# Patient Record
Sex: FEMALE | Race: WHITE | NOT HISPANIC OR LATINO | Employment: OTHER | URBAN - METROPOLITAN AREA
[De-identification: names, ages, dates, MRNs, and addresses within clinical notes are randomized per-mention and may not be internally consistent; named-entity substitution may affect disease eponyms.]

---

## 2018-02-23 ENCOUNTER — OFFICE VISIT (OUTPATIENT)
Dept: FAMILY MEDICINE CLINIC | Facility: CLINIC | Age: 38
End: 2018-02-23
Payer: COMMERCIAL

## 2018-02-23 VITALS
HEIGHT: 65 IN | TEMPERATURE: 98 F | DIASTOLIC BLOOD PRESSURE: 80 MMHG | SYSTOLIC BLOOD PRESSURE: 120 MMHG | WEIGHT: 185 LBS | BODY MASS INDEX: 30.82 KG/M2

## 2018-02-23 DIAGNOSIS — Z13.0 SCREENING FOR DEFICIENCY ANEMIA: ICD-10-CM

## 2018-02-23 DIAGNOSIS — Z30.09 BIRTH CONTROL COUNSELING: ICD-10-CM

## 2018-02-23 DIAGNOSIS — Z13.220 SCREENING, LIPID: ICD-10-CM

## 2018-02-23 DIAGNOSIS — Z13.1 SCREENING FOR DIABETES MELLITUS: ICD-10-CM

## 2018-02-23 DIAGNOSIS — Z13.29 SCREENING FOR THYROID DISORDER: ICD-10-CM

## 2018-02-23 DIAGNOSIS — Z23 NEED FOR DTAP VACCINATION: ICD-10-CM

## 2018-02-23 DIAGNOSIS — L71.9 ROSACEA: Primary | ICD-10-CM

## 2018-02-23 PROCEDURE — 90471 IMMUNIZATION ADMIN: CPT

## 2018-02-23 PROCEDURE — 90715 TDAP VACCINE 7 YRS/> IM: CPT

## 2018-02-23 PROCEDURE — 99203 OFFICE O/P NEW LOW 30 MIN: CPT | Performed by: FAMILY MEDICINE

## 2018-02-23 RX ORDER — DOXYCYCLINE HYCLATE 100 MG/1
100 CAPSULE ORAL DAILY
Qty: 30 CAPSULE | Refills: 3 | Status: SHIPPED | OUTPATIENT
Start: 2018-02-23 | End: 2018-03-25

## 2018-02-23 RX ORDER — METRONIDAZOLE 10 MG/G
GEL TOPICAL DAILY
Qty: 45 G | Refills: 1 | Status: SHIPPED | OUTPATIENT
Start: 2018-02-23

## 2018-02-23 NOTE — PATIENT INSTRUCTIONS
Rosacea   WHAT YOU NEED TO KNOW:   What is rosacea? Rosacea is a long-term skin condition that causes inflammation of your face  Rosacea usually affects the cheeks and chin  It may also affect the nose, forehead, and eyes  There is no known cause of rosacea  Healthcare providers believe the facial redness occurs when blood vessels in your face widen  Rosacea is more common in women, people with fair skin, and those aged 27 or older  What are the signs and symptoms of rosacea? Your face will be red with red bumps  The bumps often look like acne pimples  Symptoms range from mild to severe  You may have periods of time with no symptoms  When you have symptoms, this is called a flare  Some signs and symptoms will depend on the type of rosacea you have  The 4 types of rosacea are:  Erythematotelangiectatic:  This is also called ETR  Your face may be red and swollen for long periods  Your skin may burn, itch, or sting more easily when you use creams or lotions on your face  Your skin may also be drier than with other types of rosacea  Papulopustular: Your skin may swell, burn, or sting  The bumps on your skin may be filled with pus  Phymatous: This type causes your skin on your nose, chin, forehead, cheeks, eyelids, or ears to thicken  Your nose may also look bumpy  Ocular: This type of rosacea affects your eyes  You may have dry or watery, red eyes  Your eyes may burn, sting, or itch  You may have eyelid swelling or feel like you have something in your eye  You may also have blurry vision  Your eyes may hurt when you are in bright light  What increases my risk for rosacea flares? Heavy exercise    Hot drinks or drinks that contain alcohol    Spicy foods    Stress, sudden laughter, or embarrassment    Sun exposure    Very hot, cold, or windy weather  How is rosacea diagnosed? Your healthcare provider will ask about your signs and symptoms  He may know you have rosacea by looking at your skin   Your healthcare provider may also do blood tests to learn if another medical condition is the cause of your symptoms  How is rosacea treated? Rosacea has no cure  With treatment, your symptoms can be controlled  Ask your healthcare provider for more information about the following treatments:  Antibiotic medicines: This medicine is given to treat or prevent infection  Antibiotics may also help decrease swelling, redness, and acne-like bumps  This medicine may be given as a pill or a cream to apply on your face  Artificial tears: These help keep your eyes moist if you have dryness from ocular rosacea  Laser ablation:  A laser removes tissue buildup and reduces redness and swelling  Surgery: You may need surgery to remove thickened skin on your face  How can I prevent a rosacea flare? Avoid hot drinks or drinks that contain alcohol  Avoid being in the sun for long periods of time  Use sunscreen that is SPF 15 or higher every time you go outside  Wear a wide-brimmed hat while you are outdoors  Avoid skin care products that have alcohol, menthol, or salt in them  Use fragrance-free products to wash your face  Be gentle when you wash your face to avoid irritation  Ask your healthcare provider which products are best to treat dry skin  Clean your eyelids as directed  Your healthcare provider may suggest you put warm compresses on your eyes 2 times each day  When should I contact my healthcare provider? You have new or worse eye redness or itching  You feel depressed about the look of your skin  You have questions about your condition or care  When should I seek immediate care or call 911? You have new or increased blurry vision, or you have vision loss  CARE AGREEMENT:   You have the right to help plan your care  Learn about your health condition and how it may be treated  Discuss treatment options with your caregivers to decide what care you want to receive   You always have the right to refuse treatment  The above information is an  only  It is not intended as medical advice for individual conditions or treatments  Talk to your doctor, nurse or pharmacist before following any medical regimen to see if it is safe and effective for you  © 2017 [address] [employer] Information is for End User's use only and may not be sold, redistributed or otherwise used for commercial purposes  All illustrations and images included in CareNotes® are the copyrighted property of [employer]  or [employer]  Kegel Exercises for Women   WHAT YOU NEED TO KNOW:   What are Kegel exercises? Kegel exercises help strengthen your pelvic muscles  Pelvic muscles hold your pelvic organs, such as your bladder and uterus, in place  Kegel exercises help prevent or control problems with urine incontinence (leakage)  Incontinence may be caused by pregnancy, childbirth, or menopause  How will I know I am using the correct muscles? Pelvic muscles are the muscles you use to control urine flow  To target these muscles, stop and start the flow of urine several times  This will help you become familiar with how it feels to tighten and relax these muscles  How should I do Kegel exercises? Empty your bladder  You may lie down, stand up, or sit down to do these exercises  When you first try to do these exercises, it may be easier if you lie down  Tighten or squeeze your pelvic muscles slowly  It may feel like you are trying to hold back urine or gas  Hold this position for 3 seconds  Relax for 3 seconds  Repeat this cycle 10 times  Do 10 sets of Kegel exercises, at least 3 times a day  Do not hold your breath when you do Kegel exercises  Keep your stomach, back, and leg muscles relaxed  As your muscles get stronger, you will be able to hold the squeeze longer  Your healthcare provider may ask that you increase your pelvic muscle squeeze to 10 seconds   After you squeeze for 10 seconds, relax for 10 seconds  What else should I know? Once you know how to do Kegel exercises, use different positions  This will help to strengthen your pelvic muscles as much as possible  You can do these exercises while you lie on the floor, watch TV, or while you stand  You may notice improved bladder control within about 6 weeks  Tighten your pelvic muscles before you sneeze, cough, or lift to prevent urine leakage  When should I contact my healthcare provider? You cannot feel your pelvic muscles tighten or relax  You continue to leak urine  You have questions or concerns about your condition or care  CARE AGREEMENT:   You have the right to help plan your care  Learn about your health condition and how it may be treated  Discuss treatment options with your caregivers to decide what care you want to receive  You always have the right to refuse treatment  The above information is an  only  It is not intended as medical advice for individual conditions or treatments  Talk to your doctor, nurse or pharmacist before following any medical regimen to see if it is safe and effective for you  © 2017 [address] [facility] Information is for End User's use only and may not be sold, redistributed or otherwise used for commercial purposes  All illustrations and images included in CareNotes® are the copyrighted property of [employer]  or [employer]  Birth Control Pills   WHAT YOU NEED TO KNOW:   Birth control pills are also called oral contraceptives, or the pill  It is medicine that helps prevent pregnancy  Birth control pills work by preventing ovulation  Ovulation is when the ovaries make and release an egg cell each month  If this egg gets fertilized by sperm, pregnancy occurs  Birth control pills may also help to prevent pregnancy by keeping sperm from fertilizing an egg     DISCHARGE INSTRUCTIONS:   Follow up with your healthcare provider as directed:  Write down your questions so you remember to ask them during your visits  Advantages of birth control pills:  When birth control pills are used correctly, the chances of getting pregnant are very low  Birth control pills may help decrease bleeding and pain during your monthly period  They may also help prevent cancer of the uterus and ovaries  Disadvantages of birth control pills: You may have sudden changes in your mood or feelings while you take birth control pills  You may have nausea and decreased sex drive  You may have an increased appetite and rapid weight gain  You may also have bleeding in between periods, less frequent periods, vaginal dryness, and breast pain  Birth control pills will not protect you from sexually transmitted infections  Rarely, some birth control pills can increase your risk for a blood clot  This may become life-threatening  If you want to get pregnant: If you are planning to have a baby, ask your healthcare provider when you may stop taking your birth control pills  It may take some time for you to start ovulating again  Ask your healthcare provider for more information about pregnancy after birth control pills  When to start taking birth control pills after you have a baby: If you are not breastfeeding, you may start taking birth control pills 3 weeks after you give birth  You may be able to take certain types of birth control pills if you are breastfeeding  These pills can be started from 6 weeks to 6 months after you give birth  Ask your healthcare provider for more information about when to start taking birth control pills after you give birth  Contact your healthcare provider if:   You have forgotten to take a birth control pill  You have mood changes, such as depression, since starting birth control pills  You have nausea or you are vomiting  You have severe abdominal pain  You missed a period and have questions or concerns about being pregnant      You still have bleeding 4 months after taking birth control pills correctly  You have questions or concerns about your condition or care  Return to the emergency department if:   Your arm or leg feels warm, tender, and painful  It may look swollen and red  You feel lightheaded, short of breath, and have chest pain  You cough up blood  You have any of the following signs of a stroke:      Numbness or drooping on one side of your face     Weakness in an arm or leg    Confusion or difficulty speaking    Dizziness, a severe headache, or vision loss    You have severe pain, numbness, or swelling in your arms or legs  © 2017 [address] [employer]  Information is for End User's use only and may not be sold, redistributed or otherwise used for commercial purposes  All illustrations and images included in CareNotes® are the copyrighted property of [employer]  or [employer]  The above information is an  only  It is not intended as medical advice for individual conditions or treatments  Talk to your doctor, nurse or pharmacist before following any medical regimen to see if it is safe and effective for you  Diphtheria/Acellular Pertussis/Tetanus Booster Vaccine (Tdap) (By injection)   Pertussis Vaccine, Acellular (per-TUS-iss VAX-een, g-LAYG-alg-lar), Reduced Diphtheria Toxoid (ree-DOOST dif-THEER-ee-a TOX-oyd), Tetanus Toxoid (TET-a-nus TOX-oyd)  Protects against infections caused by tetanus (lockjaw), diphtheria, or pertussis (whooping cough)  This is a booster vaccine  Brand Name(s): Adacel, Boostrix   There may be other brand names for this medicine  When This Medicine Should Not Be Used: You should not receive this vaccine if you have had an allergic reaction to the separate or combined tetanus, diphtheria, or pertussis vaccine   You should not receive this vaccine if you have had seizures, mental changes, or any other serious reaction within 7 days after you received a pertussis vaccine  How to Use This Medicine:   Injectable  · A nurse or other health provider will give you this medicine  · Your doctor will prescribe your exact dose and tell you how often it should be given  This medicine is given as a shot into one of your muscles  · You may receive other vaccines at the same time as this one, but in a different body area  You should receive patient instructions for all of the vaccines  Talk to your doctor or nurse if you have questions  · Read and follow the patient instructions that come with this medicine  Talk to your doctor or pharmacist if you have any questions  Drugs and Foods to Avoid:   Ask your doctor or pharmacist before using any other medicine, including over-the-counter medicines, vitamins, and herbal products  · Make sure the doctor knows if you are receiving a treatment or medicine that weakens your immune system  This includes radiation treatment, steroid medicines (such as dexamethasone, hydrocortisone, methylprednisolone, prednisolone, prednisone, Medrol®), or cancer medicines  Warnings While Using This Medicine:   · Make sure your doctor knows if you are pregnant or breastfeeding or have epilepsy, a weak immune system, or a history of a stroke  Tell your doctor if you are sick or have a fever  · Tell your doctor about any reaction you had after you received a vaccine  This includes fainting, seizures, a fever over 105 degrees F, or severe redness or swelling where the shot was given  Tell your doctor if you have a history of Guillain-Barré syndrome after you received a vaccine with tetanus  · Call your doctor right away if you faint or have vision changes, numbness or tingling in your arms, hands, or feet, or a seizure after you receive this vaccine  · Tell your doctor if you have an allergy to latex  The syringes may contain dry natural latex rubber  · This vaccine will not treat an active infection   If you have a diphtheria, tetanus, or pertussis infection, you will need medicine to treat the infection  Possible Side Effects While Using This Medicine:   Call your doctor right away if you notice any of these side effects:  · Allergic reaction: Itching or hives, swelling in your face or hands, swelling or tingling in your mouth or throat, chest tightness, trouble breathing  · Changes in vision  · Fever over 105 degrees F  · Lightheadedness or fainting  · Numbness, tingling, or burning pain in your hands, arms, legs, or feet  · Seizures  · Sudden numbness or weakness in your arms or legs  · Severe pain, redness, or swelling where the shot was given  If you notice these less serious side effects, talk with your doctor:   · Headache  · Mild pain, redness, or swelling where the shot was given  · Nausea, vomiting, diarrhea, or stomach pain  · Tiredness  If you notice other side effects that you think are caused by this medicine, tell your doctor  Call your doctor for medical advice about side effects  You may report side effects to FDA at 3-027-FDA-4039  © 2017 [address] [employer] Information is for End User's use only and may not be sold, redistributed or otherwise used for commercial purposes  The above information is an  only  It is not intended as medical advice for individual conditions or treatments  Talk to your doctor, nurse or pharmacist before following any medical regimen to see if it is safe and effective for you

## 2018-02-23 NOTE — PROGRESS NOTES
Assessment/Plan:    Rosacea  Continue metrogel as directed and add the antibiotic Patient was also given educational information       Diagnoses and all orders for this visit:    Rosacea  -     metroNIDAZOLE (METROGEL) 1 % gel; Apply topically daily  -     doxycycline hyclate (VIBRAMYCIN) 100 mg capsule; Take 1 capsule (100 mg total) by mouth daily for 30 days    Birth control counseling  Comments:  Patient was given information about various methods of birthcontrol Patient was recommended to ahve PAP smear also    Screening for diabetes mellitus  Comments:  check labs  Orders:  -     Comprehensive metabolic panel; Future    Screening, lipid  Comments:  check labs  Orders:  -     Lipid panel; Future    Screening for thyroid disorder  Comments:  check labs  Orders:  -     TSH, 3rd generation with T4 reflex; Future    Screening for deficiency anemia  Comments:  check labs  Orders:  -     CBC and differential; Future    Need for DTaP vaccination  Comments:  information on tetanus shot given to patient and shot given also  Orders:  -     Tdap vaccine greater than or equal to 6yo IM          Subjective:   Chief Complaint   Patient presents with    Rosacea          Patient ID: Umer Crews is a 40 y o  female      Patient is here as a new patient she is here due to [address] [place] [address] Patient sixto had this for several years Was seen by her doctor in [place] about 1 and a half years ago and was placed on metrogel She gets some help from it but not enough She moved her about 8 months ago She is interested in "better treatment" for the rosacea patient has not seen GYN in over one year She is not sexullay active Hwoever she was on depo until last year Patient is interested getting PAP and likely going on OCP's Patient is also planning on moving again She was in foster care patient works at the [employer] as a  patient has only one other issue besides the rosacea and that is once is awhile she leaks urine with laugh or cough or with likfting That has been going on >2 yrs now      Rash   This is a chronic problem  The current episode started more than 1 year ago  The problem is unchanged  The affected locations include the face  The rash is characterized by redness and swelling  She was exposed to nothing  Pertinent negatives include no anorexia, congestion, cough, diarrhea, eye pain, facial edema, fatigue, fever, joint pain, nail changes, rhinorrhea, shortness of breath, sore throat or vomiting  Past treatments include cold compress and moisturizer  The treatment provided mild relief  There is no history of allergies, asthma, eczema or varicella  The following portions of the patient's history were reviewed and updated as appropriate: allergies, current medications, past social history and problem list     Review of Systems   Constitutional: Negative for fatigue, fever and unexpected weight change  HENT: Negative for congestion, rhinorrhea, sinus pain, sore throat and trouble swallowing  Eyes: Negative for pain, discharge and visual disturbance  Respiratory: Negative for cough, chest tightness, shortness of breath and wheezing  Cardiovascular: Negative for chest pain, palpitations and leg swelling  Gastrointestinal: Negative for abdominal pain, anorexia, blood in stool, constipation, diarrhea, nausea and vomiting  Genitourinary: Negative for difficulty urinating, dysuria, frequency and hematuria  Stress incontinence rarely over the last 2 yrs   Musculoskeletal: Negative for arthralgias, gait problem, joint pain and joint swelling  Skin: Positive for rash  Negative for nail changes and wound  Allergic/Immunologic: Negative for environmental allergies and food allergies  Neurological: Negative for dizziness, syncope, weakness, numbness and headaches  Hematological: Negative for adenopathy  Does not bruise/bleed easily     Psychiatric/Behavioral: Negative for confusion, decreased concentration and sleep disturbance  The patient is not nervous/anxious  Objective:      /80   Temp 98 °F (36 7 °C)   Ht 5' 5" (1 651 m)   Wt 83 9 kg (185 lb)   BMI 30 79 kg/m²          Physical Exam   Constitutional: She is oriented to person, place, and time  She appears well-developed and well-nourished  HENT:   Head: Normocephalic and atraumatic  Right Ear: Hearing, tympanic membrane and external ear normal    Left Ear: Hearing, tympanic membrane and external ear normal    Eyes: Conjunctivae and EOM are normal  Pupils are equal, round, and reactive to light  Neck: Neck supple  No thyromegaly present  Cardiovascular: Normal rate and normal heart sounds  Pulmonary/Chest: Effort normal and breath sounds normal  She has no wheezes  She has no rales  Abdominal: Soft  Bowel sounds are normal  She exhibits no distension  There is no tenderness  Musculoskeletal: She exhibits no edema or tenderness  Lymphadenopathy:     She has no cervical adenopathy  Neurological: She is alert and oriented to person, place, and time  No cranial nerve deficit  Coordination normal    Skin: Skin is warm and dry  Rash noted  Psychiatric: She has a normal mood and affect   Her behavior is normal  Judgment and thought content normal

## 2018-03-12 ENCOUNTER — OFFICE VISIT (OUTPATIENT)
Dept: FAMILY MEDICINE CLINIC | Facility: CLINIC | Age: 38
End: 2018-03-12
Payer: COMMERCIAL

## 2018-03-12 VITALS
DIASTOLIC BLOOD PRESSURE: 78 MMHG | HEIGHT: 65 IN | SYSTOLIC BLOOD PRESSURE: 120 MMHG | WEIGHT: 193.8 LBS | BODY MASS INDEX: 32.29 KG/M2

## 2018-03-12 DIAGNOSIS — Z01.419 ENCOUNTER FOR GYNECOLOGICAL EXAMINATION WITHOUT ABNORMAL FINDING: Primary | ICD-10-CM

## 2018-03-12 PROCEDURE — G0145 SCR C/V CYTO,THINLAYER,RESCR: HCPCS | Performed by: FAMILY MEDICINE

## 2018-03-12 PROCEDURE — 99395 PREV VISIT EST AGE 18-39: CPT | Performed by: FAMILY MEDICINE

## 2018-03-12 NOTE — PROGRESS NOTES
Assessment/Plan:    Encounter for gynecological examination without abnormal finding  Pap smear was obtained today Her insurance does not pay for any birth control, [facility] Patient was taught self breast exam today patient will have repeat pap in 3 yrs if this one is normal Patient to start the mammograms at age 37 Patient to call with any issues       Diagnoses and all orders for this visit:    Encounter for gynecological examination without abnormal finding  -     Cervical or vaginal cytopath, thin prep; Future          Subjective:   Chief Complaint   Patient presents with    Gynecologic Exam        Patient ID: Travis Bryant is a 40 y o  female  Patient is here for a GYN exam patient last PAP was 1 yr ago Patient has not had any abnormal pap smears in the past Patient was on depo for many years stopped last year Patient since there has had irregular menses Patient is not sexually active for the last 2 yrs Patient has 1 child is 13 yrs old Patient pregnancy was normal She had no complications and has a normal vaginal delivery Patient denies any complaints        The following portions of the patient's history were reviewed and updated as appropriate: allergies, current medications, past social history and problem list     Review of Systems   Constitutional: Negative for fatigue, fever and unexpected weight change  HENT: Negative for congestion, sinus pain and trouble swallowing  Eyes: Negative for discharge and visual disturbance  Respiratory: Negative for cough, chest tightness, shortness of breath and wheezing  Cardiovascular: Negative for chest pain, palpitations and leg swelling  Gastrointestinal: Negative for abdominal pain, blood in stool, constipation, diarrhea, nausea and vomiting  Genitourinary: Positive for menstrual problem   Negative for decreased urine volume, difficulty urinating, dysuria, enuresis, flank pain, frequency, genital sores, hematuria, pelvic pain, urgency, vaginal bleeding, vaginal discharge and vaginal pain  Irregular menses off depo Last period was 11/2017   Musculoskeletal: Negative for arthralgias, gait problem and joint swelling  Skin: Negative for rash and wound  Allergic/Immunologic: Negative for environmental allergies and food allergies  Neurological: Negative for dizziness, syncope, weakness, numbness and headaches  Hematological: Negative for adenopathy  Does not bruise/bleed easily  Psychiatric/Behavioral: Negative for confusion, decreased concentration and sleep disturbance  The patient is not nervous/anxious  Objective:      /78   Ht 5' 5" (1 651 m)   Wt 87 9 kg (193 lb 12 8 oz)   BMI 32 25 kg/m²          Physical Exam   Constitutional: She is oriented to person, place, and time  She appears well-developed and well-nourished  HENT:   Head: Normocephalic and atraumatic  Right Ear: Hearing, tympanic membrane and external ear normal    Left Ear: Hearing, tympanic membrane and external ear normal    Eyes: Conjunctivae and EOM are normal  Pupils are equal, round, and reactive to light  Neck: Neck supple  No thyromegaly present  Cardiovascular: Normal rate and normal heart sounds  Pulmonary/Chest: Effort normal and breath sounds normal  She has no wheezes  She has no rales  Abdominal: Soft  Bowel sounds are normal  She exhibits no distension  There is no tenderness  Hernia confirmed negative in the right inguinal area and confirmed negative in the left inguinal area  Genitourinary: Rectum normal  No breast swelling, tenderness or bleeding  There is no rash, tenderness, lesion or injury on the right labia  There is no rash, tenderness, lesion or injury on the left labia  Uterus is not deviated, not enlarged, not fixed and not tender  Cervix exhibits no motion tenderness, no discharge and no friability  Right adnexum displays no mass, no tenderness and no fullness  Left adnexum displays no mass, no tenderness and no fullness  No erythema, tenderness or bleeding in the vagina  No foreign body in the vagina  No signs of injury around the vagina  No vaginal discharge found  Musculoskeletal: She exhibits no edema or tenderness  Lymphadenopathy:     She has no cervical adenopathy  Right: No inguinal adenopathy present  Left: No inguinal adenopathy present  Neurological: She is alert and oriented to person, place, and time  No cranial nerve deficit  Coordination normal    Skin: Skin is warm and dry  No rash noted  Psychiatric: She has a normal mood and affect   Her behavior is normal  Judgment and thought content normal

## 2018-03-12 NOTE — ASSESSMENT & PLAN NOTE
Pap smear was obtained today Her insurance does not pay for any birth control, [facility] Patient was taught self breast exam today patient will have repeat pap in 3 yrs if this one is normal Patient to start the mammograms at age 37 Patient to call with any issues

## 2018-03-12 NOTE — PATIENT INSTRUCTIONS
Patient had a pap smear done today patient will do her breast exams monthly as directed      Wellness Visit for Adults   WHAT Rickey:   What is a wellness visit? A wellness visit is when you see your healthcare provider to get screened for health problems  You can also get advice on how to stay healthy  Write down your questions so you remember to ask them  Ask your healthcare provider how often you should have a wellness visit  What happens at a wellness visit? Your healthcare provider will ask about your health, and your family history of health problems  This includes high blood pressure, heart disease, and cancer  He or she will ask if you have symptoms that concern you, if you smoke, and about your mood  You may also be asked about your intake of medicines, supplements, food, and alcohol  Any of the following may be done:  · Your weight  will be checked  Your height may also be checked so your body mass index (BMI) can be calculated  Your BMI shows if you are at a healthy weight  · Your blood pressure  and heart rate will be checked  Your temperature may also be checked  · Blood and urine tests  may be done  Blood tests may be done to check your cholesterol levels  Abnormal cholesterol levels increase your risk for heart disease and stroke  You may also need a blood or urine test to check for diabetes if you are at increased risk  Urine tests may be done to look for signs of an infection or kidney disease  · A physical exam  includes checking your heartbeat and lungs with a stethoscope  Your healthcare provider may also check your skin to look for sun damage  · Screening tests  may be recommended  A screening test is done to check for diseases that may not cause symptoms  The screening tests you may need depend on your age, gender, family history, and lifestyle habits  For example, colorectal screening may be recommended if you are 48years old or older    What screening tests do I need if I am a woman? · A Pap smear  is used to screen for cervical cancer  Pap smears are usually done every 3 to 5 years depending on your age  You may need them more often if you have had abnormal Pap smear test results in the past  Ask your healthcare provider how often you should have a Pap smear  · A mammogram  is an x-ray of your breasts to screen for breast cancer  Experts recommend mammograms every 2 years starting at age 48 years  You may need a mammogram at age 52 years or younger if you have an increased risk for breast cancer  Talk to your healthcare provider about when you should start having mammograms and how often you need them  What vaccines might I need? · Get an influenza vaccine  every year  The influenza vaccine protects you from the flu  Several types of viruses cause the flu  The viruses change over time, so new vaccines are made each year  · Get a tetanus-diphtheria (Td) booster vaccine  every 10 years  This vaccine protects you against tetanus and diphtheria  Tetanus is a severe infection that may cause painful muscle spasms and lockjaw  Diphtheria is a severe bacterial infection that causes a thick covering in the back of your mouth and throat  · Get a human papillomavirus (HPV) vaccine  if you are female and aged 23 to 32 or male 23 to 24 and never received it  This vaccine protects you from HPV infection  HPV is the most common infection spread by sexual contact  HPV may also cause vaginal, penile, and anal cancers  · Get a pneumococcal vaccine  if you are aged 72 years or older  The pneumococcal vaccine is an injection given to protect you from pneumococcal disease  Pneumococcal disease is an infection caused by pneumococcal bacteria  The infection may cause pneumonia, meningitis, or an ear infection  · Get a shingles vaccine  if you are aged 61 or older, even if you have had shingles before   The shingles vaccine is an injection to protect you from the varicella-zoster virus  This is the same virus that causes chickenpox  Shingles is a painful rash that develops in people who had chickenpox or have been exposed to the virus  How can I eat healthy? My Plate is a model for planning healthy meals  It shows the types and amounts of foods that should go on your plate  Fruits and vegetables make up about half of your plate, and grains and protein make up the other half  A serving of dairy is included on the side of your plate  The amount of calories and serving sizes you need depends on your age, gender, weight, and height  Examples of healthy foods are listed below:  · Eat a variety of vegetables  such as dark green, red, and orange vegetables  You can also include canned vegetables low in sodium (salt) and frozen vegetables without added butter or sauces  · Eat a variety of fresh fruits , canned fruit in 100% juice, frozen fruit, and dried fruit  · Include whole grains  At least half of the grains you eat should be whole grains  Examples include whole-wheat bread, wheat pasta, brown rice, and whole-grain cereals such as oatmeal     · Eat a variety of protein foods such as seafood (fish and shellfish), lean meat, and poultry without skin (turkey and chicken)  Examples of lean meats include pork leg, shoulder, or tenderloin, and beef round, sirloin, tenderloin, and extra lean ground beef  Other protein foods include eggs and egg substitutes, beans, peas, soy products, nuts, and seeds  · Choose low-fat dairy products such as skim or 1% milk or low-fat yogurt, cheese, and cottage cheese  · Limit unhealthy fats  such as butter, hard margarine, and shortening  How much exercise do I need? Exercise at least 30 minutes per day on most days of the week  Some examples of exercise include walking, biking, dancing, and swimming  You can also fit in more physical activity by taking the stairs instead of the elevator or parking farther away from stores  Include muscle strengthening activities 2 days each week  Regular exercise provides many health benefits  It helps you manage your weight, and decreases your risk for type 2 diabetes, heart disease, stroke, and high blood pressure  Exercise can also help improve your mood  Ask your healthcare provider about the best exercise plan for you  What are some general health and safety guidelines I should follow? · Do not smoke  Nicotine and other chemicals in cigarettes and cigars can cause lung damage  Ask your healthcare provider for information if you currently smoke and need help to quit  E-cigarettes or smokeless tobacco still contain nicotine  Talk to your healthcare provider before you use these products  · Limit alcohol  A drink of alcohol is 12 ounces of beer, 5 ounces of wine, or 1½ ounces of liquor  · Lose weight, if needed  Being overweight increases your risk of certain health conditions  These include heart disease, high blood pressure, type 2 diabetes, and certain types of cancer  · Protect your skin  Do not sunbathe or use tanning beds  Use sunscreen with a SPF 15 or higher  Apply sunscreen at least 15 minutes before you go outside  Reapply sunscreen every 2 hours  Wear protective clothing, hats, and sunglasses when you are outside  · Drive safely  Always wear your seatbelt  Make sure everyone in your car wears a seatbelt  A seatbelt can save your life if you are in an accident  Do not use your cell phone when you are driving  This could distract you and cause an accident  Pull over if you need to make a call or send a text message  · Practice safe sex  Use latex condoms if are sexually active and have more than one partner  Your healthcare provider may recommend screening tests for sexually transmitted infections (STIs)  · Wear helmets, lifejackets, and protective gear    Always wear a helmet when you ride a bike or motorcycle, go skiing, or play sports that could cause a head injury  Wear protective equipment when you play sports  Wear a lifejacket when you are on a boat or doing water sports  CARE AGREEMENT:   You have the right to help plan your care  Learn about your health condition and how it may be treated  Discuss treatment options with your caregivers to decide what care you want to receive  You always have the right to refuse treatment  The above information is an  only  It is not intended as medical advice for individual conditions or treatments  Talk to your doctor, nurse or pharmacist before following any medical regimen to see if it is safe and effective for you  © 2017 [employer]  Information is for End User's use only and may not be sold, redistributed or otherwise used for commercial purposes  All illustrations and images included in CareNotes® are the copyrighted property of [employer]  or [employer]

## 2018-03-15 LAB
LAB AP GYN PRIMARY INTERPRETATION: NORMAL
Lab: NORMAL